# Patient Record
Sex: FEMALE | Employment: FULL TIME
[De-identification: names, ages, dates, MRNs, and addresses within clinical notes are randomized per-mention and may not be internally consistent; named-entity substitution may affect disease eponyms.]

---

## 2017-07-29 ENCOUNTER — HEALTH MAINTENANCE LETTER (OUTPATIENT)
Age: 57
End: 2017-07-29

## 2018-02-22 ASSESSMENT — ENCOUNTER SYMPTOMS
SYNCOPE: 0
DIFFICULTY URINATING: 0
PALPITATIONS: 0
HYPERTENSION: 0
DYSURIA: 0
WEIGHT LOSS: 0
HOT FLASHES: 1
ARTHRALGIAS: 1
CHILLS: 0
WEIGHT GAIN: 0
HEMATURIA: 0
DECREASED LIBIDO: 0
ALTERED TEMPERATURE REGULATION: 0
NIGHT SWEATS: 1
LEG PAIN: 1
DECREASED APPETITE: 0
LIGHT-HEADEDNESS: 0
HALLUCINATIONS: 0
POLYPHAGIA: 0
HYPOTENSION: 0
STIFFNESS: 1
FLANK PAIN: 0
POLYDIPSIA: 0
INCREASED ENERGY: 0
FEVER: 0
MYALGIAS: 0
FATIGUE: 1
SLEEP DISTURBANCES DUE TO BREATHING: 0
BACK PAIN: 1
ORTHOPNEA: 0
EXERCISE INTOLERANCE: 1
MUSCLE WEAKNESS: 0
NECK PAIN: 1
JOINT SWELLING: 1
MUSCLE CRAMPS: 0

## 2018-02-23 ENCOUNTER — APPOINTMENT (OUTPATIENT)
Dept: LAB | Facility: CLINIC | Age: 58
End: 2018-02-23
Payer: COMMERCIAL

## 2018-02-23 ENCOUNTER — OFFICE VISIT (OUTPATIENT)
Dept: CARDIOLOGY | Facility: CLINIC | Age: 58
End: 2018-02-23
Payer: COMMERCIAL

## 2018-02-23 VITALS
DIASTOLIC BLOOD PRESSURE: 81 MMHG | HEIGHT: 61 IN | BODY MASS INDEX: 45.88 KG/M2 | WEIGHT: 243 LBS | HEART RATE: 76 BPM | SYSTOLIC BLOOD PRESSURE: 141 MMHG

## 2018-02-23 DIAGNOSIS — Z13.6 SCREENING FOR CARDIOVASCULAR CONDITION: Primary | ICD-10-CM

## 2018-02-23 DIAGNOSIS — E78.5 HYPERLIPIDEMIA LDL GOAL <100: ICD-10-CM

## 2018-02-23 DIAGNOSIS — Z13.6 SCREENING FOR CARDIOVASCULAR CONDITION: ICD-10-CM

## 2018-02-23 LAB
CHOLEST SERPL-MCNC: 208 MG/DL
CREAT UR-MCNC: 92 MG/DL
CRP SERPL HS-MCNC: 12.3 MG/L
FEF 25/75: NORMAL
FEV-1: NORMAL
FEV1/FVC: NORMAL
FVC: NORMAL
GLUCOSE SERPL-MCNC: 103 MG/DL (ref 70–99)
HDLC SERPL-MCNC: 62 MG/DL
LDLC SERPL CALC-MCNC: 122 MG/DL
MICROALBUMIN UR-MCNC: <5 MG/L
MICROALBUMIN/CREAT UR: NORMAL MG/G CR (ref 0–25)
NONHDLC SERPL-MCNC: 146 MG/DL
NT-PROBNP SERPL-MCNC: 16 PG/ML (ref 0–125)
TRIGL SERPL-MCNC: 121 MG/DL

## 2018-02-23 RX ORDER — TRANEXAMIC ACID 650 MG/1
1300 TABLET ORAL PRN
COMMUNITY

## 2018-02-23 RX ORDER — IBUPROFEN 200 MG
600 TABLET ORAL AT BEDTIME
COMMUNITY

## 2018-02-23 RX ORDER — OXYBUTYNIN CHLORIDE 10 MG/1
1 TABLET, EXTENDED RELEASE ORAL DAILY
COMMUNITY

## 2018-02-23 RX ORDER — NICOTINE POLACRILEX 4 MG/1
1 GUM, CHEWING ORAL DAILY
COMMUNITY

## 2018-02-23 NOTE — LETTER
"2/23/2018      RE: Cristina Acosta  [address] [facility] [address]       Dear Colleague,    Thank you for the opportunity to participate in the care of your patient, Cristina Acosta, at the [facility] FOR CARDIOVASCULAR DISEASE PREVENTION at [facility]. Please see a copy of my visit note below.    [facility] for Cardiovascular Disease Prevention - Exam Note    Active Problems   There are no active problems to display for this patient.      Reason For Visit   Patient here for [facility] early detection of atherosclerosis and CVD exam.    Pain Evaluation  Current history of pain associated with this visit is: denied    HPI   Cristina Acosta is a 57 year old year old female with a history of high risk weight, arthritis, mild sleep apnea,  mildly elevated lipids and family history of CAD with her brother and sister with disease onset in their 60's. Diabetes is also prevalent in her family. She has not chest pain or palpitations or dizziness but she does get winded easily with walking fast and stair climbing.  She thinks this is related to her weight. She has sleep apnea and was unable to tolerate c-pap. We discussed getting an adjustable bed to elevate her head and possible dental appliance. Weight reduction would be most beneficial.    Nutrition assessment per patient report:   She did lose 40 pounds with slim [employer] but gained the weight back when she quit the program. She also tried Yvrose jono but didn't learn how to eat with this program. She does not like to \"have to count calories or points\" so has not done well with weight watchers. We discussed a \"cut in half\" eating plan with only eating half of higher calori foods with full portions of vegetables. She would like to reduce her weight.  Foods with fat/cholesterol (fried foods, fatty meats, junk food): 3- 4 servings per week  Variety, more fried food and red meat  Fruits and vegetables (  cup cooked, 1 cup " raw):  3 servings per day  Caffeine (1 cup coffee, soda, etc):  1 serving per day  Alcohol servings (12 oz. beer, 4 oz. wine, 1  oz. in mixed drink):  2 servings per week  Calcium servings (dairy foods, 8 oz. milk, yogurt, cheese, ice cream):  2 servings per day  Salt/sodium use:  light use  Special dietary habits:  low gluten    Activity  Patient is not regularly active. We discussed setting aside one 30 minute period per week to plan fitness time and gradually increasing over time. We discussed swimming since she has knee pain and walking for 10 minutes periods 3 times daily.  Laboratory Results Review  We discussed laboratory results today including lipids targets and how foods influence cholesterol.    Weight   A normal BMI of 25 is equal to 132 pounds.  The current BMI of 46 is high-risk range.  A weight reduction speed of 1-2 lbs per month for women is recommended.    PMH   Past Medical History:   Diagnosis Date     Bladder disorder     over active     GERD (gastroesophageal reflux disease)      Osteoarthritis     back and knees     Sleep apnea     Mild, did not tolerate c-pap     Weight disorder        PSH  Past Surgical History:   Procedure Laterality Date     C OPEN UTERUS,ABD FOR MOLE,ABORTN  2003     LAPAROSCOPIC CHOLECYSTECTOMY         Current Meds   Current Outpatient Prescriptions   Medication Sig Dispense Refill     Glucos-Chondroit-Hyaluron-MSM (GLUCOSAMINE CHONDROITIN JOINT) TABS Take 2 tablets by mouth daily       ibuprofen (ADVIL/MOTRIN) 200 MG tablet Take 600 mg by mouth At Bedtime       omeprazole 20 MG tablet Take 1 tablet by mouth daily       oxybutynin (DITROPAN-XL) 10 MG 24 hr tablet Take 1 tablet by mouth daily       tranexamic acid (LYSTEDA) 650 MG tablet Take 1,300 mg by mouth as needed         Allergies      Allergies   Allergen Reactions     Dust Mites      Seasonal Allergies        Family Hx   Family History   Problem Relation Age of Onset     Lung Cancer Mother       at age 69  "    Lipids Father      Prostate Cancer Father       at age 71     DIABETES Father      borerline     Coronary Artery Disease Sister 68     Asthma Sister      Thyroid Disease Sister      Graves disease     Coronary Artery Disease Brother 64     3x CABG     DIABETES Brother      Hyperlipidemia Brother      Hypertension Paternal Grandmother      DIABETES Paternal Grandmother      HEART DISEASE Paternal Grandmother      CEREBROVASCULAR DISEASE Paternal Grandmother 70     Other - See Comments Maternal Grandmother      MVA     Tuberculosis Maternal Grandfather      Liver Cancer Paternal Grandfather 90       Social History  Cristina is  and is working full time. Her job is [employer].  She is single with no children.     Enjoyment of life is 6 with 10 enjoys life fully.    Tobacco History  History   Smoking Status     Never Smoker   Smokeless Tobacco     Not on file       ROS  CONSTITUTIONAL:  No fever, chills, or sweats. No weight gain/loss.   EENT:  No visual disturbance, ear ache, epistaxis, sore throat  ALLERGIES/IMMUNOLOGIC:  Negative  RESPIRATORY:  No cough, hemoptysis  CARDIOVASCULAR:  As per HPI  GI:  No nausea, vomiting, hematemesis, melena  :  No urinary frequency, dysuria, or hematuria  INTEGUMENT:  Negative  PSYCHIATRIC:  Negative  NEURO:  Negative  ENDOCRINE:  Negative  MUSCULOSKELETAL:  Negative  MUSCULOSKELETAL:  knee and back pain     Vital Signs   /81 (BP Location: Left arm, Patient Position: Supine, Cuff Size: Adult Large)  Pulse 76  Ht 1.549 m (5' 1\")  Wt 110.2 kg (243 lb)  BMI 45.91 kg/m2          Physical Exam   In general, the patient is a pleasant female in no apparent distress.    HEENT: NC/AT.  PERRLA.  EOMI.  Sclerae white, not injected.  Nares clear.  Pharynx without erythema or exudate.  Dentition intact.    Neck: No adenopathy.  No thyromegaly.Carotids +4/4 bilaterally without bruits.  No jugular venous distension.   Lungs: CTA.  No ronchi, wheezes, rales.   "   Cor: RRR. Normal S1, S2 splits physiologically. No murmur, rub, click, or gallop. The PMI is in the 5th ICS in the midclavicular line. There is no heave.   Abdomen: Soft, nontender, nondistended. No organomegaly.  No bruits.   Extremities: No clubbing, cyanosis, or edema. The pulses are +2/2 at the post-tibial sites bilaterally. No bruits are noted.    Recent Labs  Lab Results   Component Value Date     (H) 02/23/2018      Lab Results   Component Value Date    NTBNP 16 02/23/2018     No results found for: NTBNPI   Lab Results   Component Value Date    UCRR 92 02/23/2018      Lab Results   Component Value Date    MICROL <5 02/23/2018      No results found for: MICROALBUMIN   No results found for: CRP   Lab Results   Component Value Date    CHOL 208 (H) 02/23/2018      Lab Results   Component Value Date    TRIG 121 02/23/2018      Lab Results   Component Value Date    HDL 62 02/23/2018      Lab Results   Component Value Date     (H) 02/23/2018      No results found for: VLDL   No results found for: CHOLHDLRATIO  Lab Results   Component Value Date    NHDL 146 (H) 02/23/2018          Johnson Test Results    BASIC SPIROMETRY: Summary of two attempts (see printout for details of results)  Results Estimated range for ht/age   FVC:2.56 liter FVC: 2.45-3.70 liter   FEV1: 2.39 liter FEV1: 1.88-2.94 liter     History of asthma:  NO   History of respiratory infection current/recent:  NO     Spirometry Results:  normal      WALKING BLOOD PRESSURE RESPONSE (3 minute, 5 MET level walk)   Pre BP: 110/70 mmHg  3 min BP: 166/60 mmHg  1 min post BP: 140/70 mmHg    Pre HR: 76 bpm  3 min HR: 145 bpm  1 min post HR: 70 bpm       RETINAL VASCULAR ASSESSMENT   Left Eye Abnormality:  none  AV Ratio: 0.96    Right Eye Abnormality:  none  AV Ratio: 0.86     Retinal Assessment:  normal      ABDOMINAL AORTA ULTRASOUND (< 2.5 normal, borderline 2.5-2.9, abnormal > 3)   SupraIliac 1.58 cm    SupraRenal 1.89 cm    InfraRenal  Proximal 1.60 cm    InfraRenal Distal 2.02 cm      Abdominal Aorta Assessment:  normal      LEFT VENTRICULAR ULTRASOUND MEASUREMENTS (adjusted for BSA)  LVIDD 38.7 mm   Septa 10.3 mm   Posterior 9.0 mm     Left Ventricular US Assessment:  normal      Carotid Artery IMT measurements report and plaques in the small area examined:   Left IMT 0.706 mm  Plaques none    Right IMT 0.599  Plaques none       ECG (see tracing):  normal sinus rhythm;  Rate:81  bpm      Arterial Elasticity per age and gender (see printout):   C1 9.1mL/mmHg x 10  abnormal   C2 2.7 mL/mmHg x 100 abnormal   Supine blood pressure: 143/81 mmHg       Assessment:     Cardiovascular:  ECG  Normal sinus rhythm rate 81. No chest pain or palpitations but she does have exertional shortness of breath. This may be weight related. Will discuss with team if follow up is recommended given her family history. Nt pro BNP is normal. No carotid IMT thickening or plaques. With family history of CAD in two sibs could consider cac scan.    Blood Pressure:  No history of HTN but supine blood pressures were elevated today but standing blood pressure low normal at 110/70. Last BP in 2016 with PCP was 130/88. Her small artery compliance is low.    Lipids:  LDL at 122 mildly elevated and HDL above average at 62.  Could consider statin to help small artery elasticity although this may further increase her glucose at 103 today. She does have family history of diabetes.    Health Habit Summary:  Nutrition: Heart Healthy Eating:  some of the time   Exercise:  not regularly active  Weight:  high-risk range  Tobacco Use:  never used    Full report to follow prevention team review of test results with scanned final report.    Time spent for patient visit was 60 minutes with more than half the time spent on counseling and coordination of care.    RANJANA Julian CNP       CC  Patient Care Team:  Vincent Lyn MD as PCP - General  SELF, REFERRED

## 2018-02-23 NOTE — MR AVS SNAPSHOT
"              After Visit Summary   2/23/2018    Cristina Acosta    MRN: [id number]           Patient Information     Date Of Birth          1960        Visit Information        Provider Department      2/23/2018 9:00 AM Daisy Dubois APRN CNP [facility] for Cardiovascular Disease Prevention        Today's Diagnoses     Screening for cardiovascular condition    -  1    Hyperlipidemia LDL goal <100           Follow-ups after your visit        Who to contact     Please call your clinic at [phone] to:    Ask questions about your health    Make or cancel appointments    Discuss your medicines    Learn about your test results    Speak to your doctor            Additional Information About Your Visit        MyChart Information     [employer] gives you secure access to your electronic health record. If you see a primary care provider, you can also send messages to your care team and make appointments. If you have questions, please call your primary care clinic.  If you do not have a primary care provider, please call [phone] and they will assist you.      [employer] is an electronic gateway that provides easy, online access to your medical records. With [employer], you can request a clinic appointment, read your test results, renew a prescription or communicate with your care team.     To access your existing account, please contact your [facility] or call [phone] for assistance.        Care EveryWhere ID     This is your Care EveryWhere ID. This could be used by other organizations to access your [place] medical records  [id number]        Your Vitals Were     Pulse Height BMI (Body Mass Index)             76 1.549 m (5' 1\") 45.91 kg/m2          Blood Pressure from Last 3 Encounters:   02/23/18 141/81   12/09/05 122/78    Weight from Last 3 Encounters:   02/23/18 110.2 kg (243 lb)   12/09/05 101.7 kg (224 lb 4.8 oz)              We Performed the Following     Arterial " PulseWave Analysis     EKG 12-lead, tracing only (Same Day)     Fundus Photography     Spirometry, Breathing Capacity (in clinic)          Today's Medication Changes          These changes are accurate as of 2/23/18 11:59 PM.  If you have any questions, ask your nurse or doctor.               Stop taking these medicines if you haven't already. Please contact your care team if you have questions.     ADVAIR DISKUS 100-50 MCG/DOSE diskus inhaler   Generic drug:  fluticasone-salmeterol   Stopped by:  Daisy Dubois, RANJANA MCDOWELL                    Primary Care Provider Office Phone # Fax #    Vincent HERNANDEZ MD Riki [phone]       [facility] [address]  [place] [address]        Equal Access to Services     [facility]: Hadii aad ku hadasho Sotreeali, waaxda luqadaha, qaybta kaalmada adeegyada, waxay olga ortega . So [facility] [phone].    ATENCIÓN: Si habla español, tiene a bustillo disposición servicios gratuitos de asistencia lingüística. [facility] [phone].    We comply with applicable federal civil rights laws and [place] laws. We do not discriminate on the basis of race, color, national origin, age, disability, sex, sexual orientation, or gender identity.            Thank you!     Thank you for choosing [facility] FOR CARDIOVASCULAR DISEASE PREVENTION  for your care. Our goal is always to provide you with excellent care. Hearing back from our patients is one way we can continue to improve our services. Please take a few minutes to complete the written survey that you may receive in the mail after your visit with us. Thank you!             Your Updated Medication List - Protect others around you: Learn how to safely use, store and throw away your medicines at www.disposemymeds.org.          This list is accurate as of 2/23/18 11:59 PM.  Always use your most recent med list.                   Brand Name Dispense Instructions for use Diagnosis     GLUCOSAMINE CHONDROITIN JOINT Tabs      Take 2 tablets by mouth daily    Screening for cardiovascular condition, Hyperlipidemia LDL goal <100       ibuprofen 200 MG tablet    ADVIL/MOTRIN     Take 600 mg by mouth At Bedtime    Screening for cardiovascular condition, Hyperlipidemia LDL goal <100       omeprazole 20 MG tablet      Take 1 tablet by mouth daily    Screening for cardiovascular condition, Hyperlipidemia LDL goal <100       oxybutynin 10 MG 24 hr tablet    DITROPAN-XL     Take 1 tablet by mouth daily    Screening for cardiovascular condition, Hyperlipidemia LDL goal <100       tranexamic acid 650 MG tablet    LYSTEDA     Take 1,300 mg by mouth as needed    Screening for cardiovascular condition, Hyperlipidemia LDL goal <100

## 2018-02-23 NOTE — PROGRESS NOTES
"[facility] - Exam Note    Active Problems   There are no active problems to display for this patient.      Reason For Visit   Patient here for [facility] early detection of atherosclerosis and CVD exam.    Pain Evaluation  Current history of pain associated with this visit is: denied    HPI   Cristina Acosta is a 57 year old year old female with a history of high risk weight, arthritis, mild sleep apnea,  mildly elevated lipids and family history of CAD with her brother and sister with disease onset in their 60's. Diabetes is also prevalent in her family. She has not chest pain or palpitations or dizziness but she does get winded easily with walking fast and stair climbing.  She thinks this is related to her weight. She has sleep apnea and was unable to tolerate c-pap. We discussed getting an adjustable bed to elevate her head and possible dental appliance. Weight reduction would be most beneficial.    Nutrition assessment per patient report:   She did lose 40 pounds with [employer] but gained the weight back when she quit the program. She also tried Yvrose jono but didn't learn how to eat with this program. She does not like to \"have to count calories or points\" so has not done well with weight watchers. We discussed a \"cut in half\" eating plan with only eating half of higher calori foods with full portions of vegetables. She would like to reduce her weight.  Foods with fat/cholesterol (fried foods, fatty meats, junk food): 3- 4 servings per week  Variety, more fried food and red meat  Fruits and vegetables (  cup cooked, 1 cup raw):  3 servings per day  Caffeine (1 cup coffee, soda, etc):  1 serving per day  Alcohol servings (12 oz. beer, 4 oz. wine, 1  oz. in mixed drink):  2 servings per week  Calcium servings (dairy foods, 8 oz. milk, yogurt, cheese, ice cream):  2 servings per day  Salt/sodium use:  light use  Special dietary habits:  low gluten    Activity  Patient " is not regularly active. We discussed setting aside one 30 minute period per week to plan fitness time and gradually increasing over time. We discussed swimming since she has knee pain and walking for 10 minutes periods 3 times daily.  Laboratory Results Review  We discussed laboratory results today including lipids targets and how foods influence cholesterol.    Weight   A normal BMI of 25 is equal to 132 pounds.  The current BMI of 46 is high-risk range.  A weight reduction speed of 1-2 lbs per month for women is recommended.    PMH   Past Medical History:   Diagnosis Date     Bladder disorder     over active     GERD (gastroesophageal reflux disease)      Osteoarthritis     back and knees     Sleep apnea     Mild, did not tolerate c-pap     Weight disorder        PSH  Past Surgical History:   Procedure Laterality Date     C OPEN UTERUS,ABD FOR MOLE,ABORTN  2003     LAPAROSCOPIC CHOLECYSTECTOMY         Current Meds   Current Outpatient Prescriptions   Medication Sig Dispense Refill     Glucos-Chondroit-Hyaluron-MSM (GLUCOSAMINE CHONDROITIN JOINT) TABS Take 2 tablets by mouth daily       ibuprofen (ADVIL/MOTRIN) 200 MG tablet Take 600 mg by mouth At Bedtime       omeprazole 20 MG tablet Take 1 tablet by mouth daily       oxybutynin (DITROPAN-XL) 10 MG 24 hr tablet Take 1 tablet by mouth daily       tranexamic acid (LYSTEDA) 650 MG tablet Take 1,300 mg by mouth as needed         Allergies      Allergies   Allergen Reactions     Dust Mites      Seasonal Allergies        Family Hx   Family History   Problem Relation Age of Onset     Lung Cancer Mother       at age 69     Lipids Father      Prostate Cancer Father       at age 71     DIABETES Father      borerline     Coronary Artery Disease Sister 68     Asthma Sister      Thyroid Disease Sister      Graves disease     Coronary Artery Disease Brother 64     3x CABG     DIABETES Brother      Hyperlipidemia Brother      Hypertension Paternal Grandmother       "DIABETES Paternal Grandmother      HEART DISEASE Paternal Grandmother      CEREBROVASCULAR DISEASE Paternal Grandmother 70     Other - See Comments Maternal Grandmother      MVA     Tuberculosis Maternal Grandfather      Liver Cancer Paternal Grandfather 90       Social History  Cristina is  and is working full time. Her job is [employer].  She is single with no children.     Enjoyment of life is 6 with 10 enjoys life fully.    Tobacco History  History   Smoking Status     Never Smoker   Smokeless Tobacco     Not on file       ROS  CONSTITUTIONAL:  No fever, chills, or sweats. No weight gain/loss.   EENT:  No visual disturbance, ear ache, epistaxis, sore throat  ALLERGIES/IMMUNOLOGIC:  Negative  RESPIRATORY:  No cough, hemoptysis  CARDIOVASCULAR:  As per HPI  GI:  No nausea, vomiting, hematemesis, melena  :  No urinary frequency, dysuria, or hematuria  INTEGUMENT:  Negative  PSYCHIATRIC:  Negative  NEURO:  Negative  ENDOCRINE:  Negative  MUSCULOSKELETAL:  Negative  MUSCULOSKELETAL:  knee and back pain     Vital Signs   /81 (BP Location: Left arm, Patient Position: Supine, Cuff Size: Adult Large)  Pulse 76  Ht 1.549 m (5' 1\")  Wt 110.2 kg (243 lb)  BMI 45.91 kg/m2          Physical Exam   In general, the patient is a pleasant female in no apparent distress.    HEENT: NC/AT.  PERRLA.  EOMI.  Sclerae white, not injected.  Nares clear.  Pharynx without erythema or exudate.  Dentition intact.    Neck: No adenopathy.  No thyromegaly.Carotids +4/4 bilaterally without bruits.  No jugular venous distension.   Lungs: CTA.  No ronchi, wheezes, rales.     Cor: RRR. Normal S1, S2 splits physiologically. No murmur, rub, click, or gallop. The PMI is in the 5th ICS in the midclavicular line. There is no heave.   Abdomen: Soft, nontender, nondistended. No organomegaly.  No bruits.   Extremities: No clubbing, cyanosis, or edema. The pulses are +2/2 at the post-tibial sites bilaterally. No bruits are " noted.    Recent Labs  Lab Results   Component Value Date     (H) 02/23/2018      Lab Results   Component Value Date    NTBNP 16 02/23/2018     No results found for: NTBNPI   Lab Results   Component Value Date    UCRR 92 02/23/2018      Lab Results   Component Value Date    MICROL <5 02/23/2018      No results found for: MICROALBUMIN   No results found for: CRP   Lab Results   Component Value Date    CHOL 208 (H) 02/23/2018      Lab Results   Component Value Date    TRIG 121 02/23/2018      Lab Results   Component Value Date    HDL 62 02/23/2018      Lab Results   Component Value Date     (H) 02/23/2018      No results found for: VLDL   No results found for: CHOLHDLRATIO  Lab Results   Component Value Date    NHDL 146 (H) 02/23/2018          Johnson Test Results    BASIC SPIROMETRY: Summary of two attempts (see printout for details of results)  Results Estimated range for ht/age   FVC:2.56 liter FVC: 2.45-3.70 liter   FEV1: 2.39 liter FEV1: 1.88-2.94 liter     History of asthma:  NO   History of respiratory infection current/recent:  NO     Spirometry Results:  normal      WALKING BLOOD PRESSURE RESPONSE (3 minute, 5 MET level walk)   Pre BP: 110/70 mmHg  3 min BP: 166/60 mmHg  1 min post BP: 140/70 mmHg    Pre HR: 76 bpm  3 min HR: 145 bpm  1 min post HR: 70 bpm       RETINAL VASCULAR ASSESSMENT   Left Eye Abnormality:  none  AV Ratio: 0.96    Right Eye Abnormality:  none  AV Ratio: 0.86     Retinal Assessment:  normal      ABDOMINAL AORTA ULTRASOUND (< 2.5 normal, borderline 2.5-2.9, abnormal > 3)   SupraIliac 1.58 cm    SupraRenal 1.89 cm    InfraRenal Proximal 1.60 cm    InfraRenal Distal 2.02 cm      Abdominal Aorta Assessment:  normal      LEFT VENTRICULAR ULTRASOUND MEASUREMENTS (adjusted for BSA)  LVIDD 38.7 mm   Septa 10.3 mm   Posterior 9.0 mm     Left Ventricular US Assessment:  normal      Carotid Artery IMT measurements report and plaques in the small area examined:   Left IMT 0.706 mm   Plaques none    Right IMT 0.599  Plaques none       ECG (see tracing):  normal sinus rhythm;  Rate:81  bpm      Arterial Elasticity per age and gender (see printout):   C1 9.1mL/mmHg x 10  abnormal   C2 2.7 mL/mmHg x 100 abnormal   Supine blood pressure: 143/81 mmHg       Assessment:     Cardiovascular:  ECG  Normal sinus rhythm rate 81. No chest pain or palpitations but she does have exertional shortness of breath. This may be weight related. Will discuss with team if follow up is recommended given her family history. Nt pro BNP is normal. No carotid IMT thickening or plaques. With family history of CAD in two sibs could consider cac scan.    Blood Pressure:  No history of HTN but supine blood pressures were elevated today but standing blood pressure low normal at 110/70. Last BP in 2016 with PCP was 130/88. Her small artery compliance is low.    Lipids:  LDL at 122 mildly elevated and HDL above average at 62.  Could consider statin to help small artery elasticity although this may further increase her glucose at 103 today. She does have family history of diabetes.    Health Habit Summary:  Nutrition: Heart Healthy Eating:  some of the time   Exercise:  not regularly active  Weight:  high-risk range  Tobacco Use:  never used    Full report to follow prevention team review of test results with scanned final report.    Time spent for patient visit was 60 minutes with more than half the time spent on counseling and coordination of care.    RANJANA Julian CNP       CC  Patient Care Team:  Vincent Lyn MD as PCP - General  SELF, REFERRED  Answers for HPI/ROS submitted by the patient on 2/22/2018   General Symptoms: Yes  Skin Symptoms: No  HENT Symptoms: No  EYE SYMPTOMS: No  HEART SYMPTOMS: Yes  LUNG SYMPTOMS: No  INTESTINAL SYMPTOMS: No  URINARY SYMPTOMS: Yes  GYNECOLOGIC SYMPTOMS: Yes  BREAST SYMPTOMS: No  SKELETAL SYMPTOMS: Yes  BLOOD SYMPTOMS: No  NERVOUS SYSTEM SYMPTOMS: No  MENTAL HEALTH SYMPTOMS:  No  Fever: No  Loss of appetite: No  Weight loss: No  Weight gain: No  Fatigue: Yes  Night sweats: Yes  Chills: No  Increased stress: No  Excessive hunger: No  Excessive thirst: No  Feeling hot or cold when others believe the temperature is normal: No  Loss of height: No  Post-operative complications: No  Surgical site pain: No  Hallucinations: No  Change in or Loss of Energy: No  Hyperactivity: No  Confusion: No  Chest pain or pressure: No  Fast or irregular heartbeat: No  Pain in legs with walking: Yes  Trouble breathing while lying down: No  Fingers or toes appear blue: No  High blood pressure: No  Low blood pressure: No  Fainting: No  Murmurs: No  Pacemaker: No  Varicose veins: No  Edema or swelling: Yes  Wake up at night with shortness of breath: No  Light-headedness: No  Exercise intolerance: Yes  Trouble holding urine or incontinence: Yes  Pain or burning: No  Trouble starting or stopping: No  Increased frequency of urination: Yes  Blood in urine: No  Decreased frequency of urination: No  Frequent nighttime urination: Yes  Flank pain: No  Difficulty emptying bladder: No  Back pain: Yes  Muscle aches: No  Neck pain: Yes  Swollen joints: Yes  Joint pain: Yes  Bone pain: No  Muscle cramps: No  Muscle weakness: No  Joint stiffness: Yes  Bone fracture: No  Bleeding or spotting between periods: No  Heavy or painful periods: No  Irregular periods: Yes  Vaginal discharge: No  Hot flashes: Yes  Vaginal dryness: No  Genital ulcers: No  Reduced libido: No  Painful intercourse: No  Difficulty with sexual arousal: No  Post-menopausal bleeding: No

## 2018-02-26 LAB — INTERPRETATION ECG - MUSE: NORMAL

## 2018-03-02 DIAGNOSIS — Z82.49 FAMILY HISTORY OF CORONARY ARTERIOSCLEROSIS: ICD-10-CM

## 2018-03-02 DIAGNOSIS — E78.5 HYPERLIPIDEMIA LDL GOAL <100: Primary | ICD-10-CM

## 2018-03-05 ENCOUNTER — HOSPITAL ENCOUNTER (OUTPATIENT)
Dept: CT IMAGING | Facility: CLINIC | Age: 58
Discharge: HOME | End: 2018-03-05
Attending: NURSE PRACTITIONER | Admitting: NURSE PRACTITIONER
Payer: COMMERCIAL

## 2018-03-05 DIAGNOSIS — E78.5 HYPERLIPIDEMIA LDL GOAL <100: ICD-10-CM

## 2018-03-05 DIAGNOSIS — Z82.49 FAMILY HISTORY OF CORONARY ARTERIOSCLEROSIS: ICD-10-CM

## 2018-03-05 LAB — RADIOLOGIST FLAGS: NORMAL

## 2018-03-05 PROCEDURE — 75571 CT HRT W/O DYE W/CA TEST: CPT | Mod: 26 | Performed by: INTERNAL MEDICINE

## 2018-03-05 PROCEDURE — 75571 CT HRT W/O DYE W/CA TEST: CPT

## 2018-03-06 ENCOUNTER — TELEPHONE (OUTPATIENT)
Dept: CARDIOLOGY | Facility: CLINIC | Age: 58
End: 2018-03-06

## 2018-03-06 NOTE — TELEPHONE ENCOUNTER
Reviewed CAC scan results with score of 0, low CV risk category. Incidental lung finding suggest possible infection or other cause. She does report a recent cough. Will forward results to her PCP Dr. Michael and she will call him next week to discuss these results, follow up and her cough. She is aware a recheck in 3 months is recommended.

## 2019-11-04 ENCOUNTER — HEALTH MAINTENANCE LETTER (OUTPATIENT)
Age: 59
End: 2019-11-04

## 2020-01-30 ASSESSMENT — MIFFLIN-ST. JEOR: SCORE: 1576.52

## 2020-02-06 ENCOUNTER — ANESTHESIA - HEALTHEAST (OUTPATIENT)
Dept: SURGERY | Facility: CLINIC | Age: 60
End: 2020-02-06

## 2020-02-06 ENCOUNTER — DOCUMENTATION ONLY (OUTPATIENT)
Dept: OTHER | Facility: CLINIC | Age: 60
End: 2020-02-06

## 2020-02-06 ENCOUNTER — AMBULATORY - HEALTHEAST (OUTPATIENT)
Dept: OTHER | Facility: CLINIC | Age: 60
End: 2020-02-06

## 2020-02-06 ENCOUNTER — SURGERY - HEALTHEAST (OUTPATIENT)
Dept: SURGERY | Facility: CLINIC | Age: 60
End: 2020-02-06

## 2020-02-06 ASSESSMENT — MIFFLIN-ST. JEOR: SCORE: 1562.92

## 2020-02-07 ENCOUNTER — AMBULATORY - HEALTHEAST (OUTPATIENT)
Dept: OTHER | Facility: CLINIC | Age: 60
End: 2020-02-07

## 2020-02-07 ASSESSMENT — MIFFLIN-ST. JEOR: SCORE: 1595.57

## 2020-10-02 ENCOUNTER — AMBULATORY - HEALTHEAST (OUTPATIENT)
Dept: SURGERY | Facility: CLINIC | Age: 60
End: 2020-10-02

## 2020-10-02 DIAGNOSIS — Z11.59 ENCOUNTER FOR SCREENING FOR OTHER VIRAL DISEASES: ICD-10-CM

## 2020-11-03 ASSESSMENT — MIFFLIN-ST. JEOR: SCORE: 1641.26

## 2020-11-09 ENCOUNTER — AMBULATORY - HEALTHEAST (OUTPATIENT)
Dept: LAB | Facility: CLINIC | Age: 60
End: 2020-11-09

## 2020-11-09 DIAGNOSIS — Z11.59 ENCOUNTER FOR SCREENING FOR OTHER VIRAL DISEASES: ICD-10-CM

## 2020-11-11 ENCOUNTER — COMMUNICATION - HEALTHEAST (OUTPATIENT)
Dept: SCHEDULING | Facility: CLINIC | Age: 60
End: 2020-11-11

## 2020-11-12 ENCOUNTER — ANESTHESIA - HEALTHEAST (OUTPATIENT)
Dept: SURGERY | Facility: CLINIC | Age: 60
End: 2020-11-12

## 2020-11-12 ENCOUNTER — SURGERY - HEALTHEAST (OUTPATIENT)
Dept: SURGERY | Facility: CLINIC | Age: 60
End: 2020-11-12

## 2020-11-12 ASSESSMENT — MIFFLIN-ST. JEOR: SCORE: 1643.65

## 2020-11-22 ENCOUNTER — HEALTH MAINTENANCE LETTER (OUTPATIENT)
Age: 60
End: 2020-11-22

## 2020-11-30 ENCOUNTER — RECORDS - HEALTHEAST (OUTPATIENT)
Dept: ADMINISTRATIVE | Facility: OTHER | Age: 60
End: 2020-11-30

## 2020-11-30 ENCOUNTER — HOSPITAL ENCOUNTER (OUTPATIENT)
Dept: ULTRASOUND IMAGING | Facility: CLINIC | Age: 60
Discharge: HOME | End: 2020-11-30
Attending: ORTHOPAEDIC SURGERY

## 2020-11-30 DIAGNOSIS — M25.461 PAIN AND SWELLING OF RIGHT KNEE: ICD-10-CM

## 2020-11-30 DIAGNOSIS — M25.561 PAIN AND SWELLING OF RIGHT KNEE: ICD-10-CM

## 2021-06-04 VITALS — WEIGHT: 234.2 LBS | HEIGHT: 61 IN | BODY MASS INDEX: 44.22 KG/M2

## 2021-06-05 VITALS — HEIGHT: 61 IN | WEIGHT: 249.4 LBS | BODY MASS INDEX: 47.09 KG/M2

## 2021-06-05 NOTE — ANESTHESIA POSTPROCEDURE EVALUATION
Patient: Cristina Acosta  Procedure(s):  LEFT TOTAL KNEE ARTHROPLASTY (Left)  Anesthesia type: spinal    Patient location: PACU  Last vitals:   Vitals Value Taken Time   /57 2/6/2020  2:20 PM   Temp 36.6  C (97.8  F) 2/6/2020  2:00 PM   Pulse 73 2/6/2020  2:21 PM   Resp 12 2/6/2020  2:20 PM   SpO2 100 % 2/6/2020  2:21 PM   Vitals shown include unvalidated device data.  Post vital signs: stable  Level of consciousness: awake and responds to simple questions  Post-anesthesia pain: pain controlled  Post-anesthesia nausea and vomiting: no  Pulmonary: unassisted, return to baseline  Cardiovascular: stable and blood pressure at baseline  Hydration: adequate  Anesthetic events: no    QCDR Measures:  ASA# 11 - Miriam-op Cardiac Arrest: ASA11B - Patient did NOT experience unanticipated cardiac arrest  ASA# 12 - Miriam-op Mortality Rate: ASA12B - Patient did NOT die  ASA# 13 - PACU Re-Intubation Rate: ASA13B - Patient did NOT require a new airway mgmt  ASA# 10 - Composite Anes Safety: ASA10A - No serious adverse event    Additional Notes:

## 2021-06-05 NOTE — ANESTHESIA PROCEDURE NOTES
Spinal Block    Patient location during procedure: OR  Start time: 2/6/2020 11:18 AM  End time: 2/6/2020 11:22 AM  Reason for block: primary anesthetic    Staffing:  Performing  Anesthesiologist: Rajiv Martinez MD    Preanesthetic Checklist  Completed: patient identified, risks, benefits, and alternatives discussed, timeout performed, consent obtained, airway assessed, oxygen available, suction available, emergency drugs available and hand hygiene performed  Spinal Block  Patient position: sitting  Prep: ChloraPrep and site prepped and draped  Patient monitoring: heart rate, cardiac monitor, continuous pulse ox and blood pressure  Approach: midline  Location: L3-4  Injection technique: single-shot  Needle type: pencil-tip   Needle gauge: 24 G

## 2021-06-05 NOTE — ANESTHESIA PREPROCEDURE EVALUATION
Anesthesia Evaluation      Patient summary reviewed     Airway   Mallampati: II  Neck ROM: full   Pulmonary - negative ROS and normal exam    breath sounds clear to auscultation  (+) sleep apnea on CPAP, ,                          Cardiovascular - negative ROS and normal exam  Rhythm: regular  Rate: normal,         Neuro/Psych - negative ROS     Endo/Other    (+) arthritis, obesity,      GI/Hepatic/Renal    (+) GERD well controlled,             Dental - normal exam                        Anesthesia Plan  Planned anesthetic: spinal and peripheral nerve block    ASA 3     Anesthetic plan and risks discussed with: patient and spouse    Post-op plan: routine recovery

## 2021-06-05 NOTE — PROGRESS NOTES
S: Pt. seen in the [facility]. Female. Bre CNP. RX: Left AFO, Shoe. DX: Left foot drop.  O:A: Pt. had knee surgery yesterday and has developed left foot drop. Today I F/D a Left Regulo saldana Walk on AFO size Small. AFO to inhibit foot drop when ambulating. Shoe is needed due edema in lower extremity because Pt. could not don her own shoe. Pt. ambulated in her room with a heel toe gait pattern. Donning doffing wear and care instructions given.  P: Pt. to be seen as needed.    Crow TERRAZAS,KATELYN

## 2021-06-05 NOTE — ANESTHESIA CARE TRANSFER NOTE
Last vitals:   Vitals:    02/06/20 1324   BP: 106/64   Pulse: 80   Resp: 14   Temp: 37  C (98.6  F)   SpO2: 100%     Patient's level of consciousness is drowsy  Spontaneous respirations: yes  Maintains airway independently: yes  Dentition unchanged: yes  Oropharynx: oropharynx clear of all foreign objects    QCDR Measures:  ASA# 20 - Surgical Safety Checklist: WHO surgical safety checklist completed prior to induction    PQRS# 430 - Adult PONV Prevention: 4558F - Pt received => 2 anti-emetic agents (different classes) preop & intraop  ASA# 8 - Peds PONV Prevention: NA - Not pediatric patient, not GA or 2 or more risk factors NOT present  PQRS# 424 - Miriam-op Temp Management: 4559F - At least one body temp DOCUMENTED => 35.5C or 95.9F within required timeframe  PQRS# 426 - PACU Transfer Protocol: - Transfer of care checklist used  ASA# 14 - Acute Post-op Pain: ASA14B - Patient did NOT experience pain >= 7 out of 10

## 2021-06-05 NOTE — ANESTHESIA PROCEDURE NOTES
Peripheral Block    Patient location during procedure: pre-op  Start time: 2/6/2020 10:03 AM  End time: 2/6/2020 10:05 AM  post-op analgesia per surgeon order as noted in medical record  Staffing:  Performing  Anesthesiologist: Rajiv Martinez MD  Preanesthetic Checklist  Completed: patient identified, site marked, risks, benefits, and alternatives discussed, timeout performed, consent obtained, airway assessed, oxygen available, suction available, emergency drugs available and hand hygiene performed  Peripheral Block  Block type: saphenous, adductor canal block  Prep: ChloraPrep  Patient position: supine  Patient monitoring: cardiac monitor, continuous pulse oximetry, heart rate and blood pressure  Laterality: left  Injection technique: ultrasound guided    Ultrasound used to visualize needle placement in proximity to nerve being blocked: yes   US used to visualize anesthetic spread  Visualized anatomic structures normal  No Pathological Findings  Permanent ultrasound image captured for medical record  Sterile gel and probe cover used for ultrasound.  Needle  Needle type: Stimuplex   Needle gauge: 20G  Needle length: 6 in  no peripheral nerve catheter placed  Assessment  Injection assessment: no difficulty with injection, negative aspiration for heme, no paresthesia on injection and incremental injection

## 2021-06-13 NOTE — ANESTHESIA POSTPROCEDURE EVALUATION
Patient: Cristina Acosta  Procedure(s):  RIGHT TOTAL KNEE ARTHROPLASTY (Right)  Anesthesia type: spinal    Patient location: PACU  Last vitals:   Vitals Value Taken Time   /83 11/12/20 1310   Temp 36.6  C (97.8  F) 11/12/20 1250   Pulse 77 11/12/20 1311   Resp 19 11/12/20 1310   SpO2 97 % 11/12/20 1311   Vitals shown include unvalidated device data.  Post vital signs: stable  Level of consciousness: awake and responds to simple questions  Post-anesthesia pain: pain controlled  Post-anesthesia nausea and vomiting: no  Pulmonary: unassisted, return to baseline  Cardiovascular: stable and blood pressure at baseline  Hydration: adequate  Anesthetic events: no    QCDR Measures:  ASA# 11 - Miriam-op Cardiac Arrest: ASA11B - Patient did NOT experience unanticipated cardiac arrest  ASA# 12 - Miriam-op Mortality Rate: ASA12B - Patient did NOT die  ASA# 13 - PACU Re-Intubation Rate: NA - No ETT / LMA used for case  ASA# 10 - Composite Anes Safety: ASA10A - No serious adverse event    Additional Notes:  Recovery as anticipated from spinal anesthetic.  No apparent complications.

## 2021-06-13 NOTE — ANESTHESIA CARE TRANSFER NOTE
Last vitals:   Vitals:    11/12/20 1230   BP: 132/70   Pulse: 76   Resp: 16   Temp: 36.8  C (98.3  F)   SpO2: 100%     Patient's level of consciousness is awake  Spontaneous respirations: yes  Maintains airway independently: yes  Dentition unchanged: yes  Oropharynx: oropharynx clear of all foreign objects    QCDR Measures:  ASA# 20 - Surgical Safety Checklist: WHO surgical safety checklist completed prior to induction    PQRS# 430 - Adult PONV Prevention: 4558F - Pt received => 2 anti-emetic agents (different classes) preop & intraop  ASA# 8 - Peds PONV Prevention: NA - Not pediatric patient, not GA or 2 or more risk factors NOT present  PQRS# 424 - Miriam-op Temp Management: 4559F - At least one body temp DOCUMENTED => 35.5C or 95.9F within required timeframe  PQRS# 426 - PACU Transfer Protocol: - Transfer of care checklist used  ASA# 14 - Acute Post-op Pain: ASA14B - Patient did NOT experience pain >= 7 out of 10

## 2021-06-13 NOTE — ANESTHESIA PREPROCEDURE EVALUATION
Anesthesia Evaluation      Patient summary reviewed     Airway   Mallampati: II  Neck ROM: full   Pulmonary - normal exam   (+) sleep apnea on CPAP, ,                          Cardiovascular - negative ROS and normal exam   Neuro/Psych - negative ROS     Endo/Other - negative ROS      GI/Hepatic/Renal    (+) GERD,             Dental - normal exam                        Anesthesia Plan  Planned anesthetic: spinal and peripheral nerve block    ASA 3   Induction: intravenous   Anesthetic plan and risks discussed with: patient    Post-op plan: routine recovery

## 2021-06-13 NOTE — ANESTHESIA PROCEDURE NOTES
Spinal Block    Patient location during procedure: OR  Start time: 11/12/2020 9:38 AM  End time: 11/12/2020 9:59 AM  Reason for block: primary anesthetic    Staffing:  Performing  Anesthesiologist: Jay Pate MD    Preanesthetic Checklist  Completed: patient identified, risks, benefits, and alternatives discussed, timeout performed, consent obtained, airway assessed, oxygen available, suction available, emergency drugs available and hand hygiene performed  Spinal Block  Patient position: sitting  Prep: ChloraPrep  Patient monitoring: heart rate, cardiac monitor, continuous pulse ox and blood pressure  Approach: midline  Location: L2-3  Injection technique: single-shot  Needle type: pencil-tip   Needle gauge: 24 G      Additional Notes:  Very difficult spinal, multiple attempts.

## 2021-06-16 PROBLEM — M17.0 PRIMARY OSTEOARTHRITIS OF KNEES, BILATERAL: Status: ACTIVE | Noted: 2020-02-06

## 2021-09-18 ENCOUNTER — HEALTH MAINTENANCE LETTER (OUTPATIENT)
Age: 61
End: 2021-09-18

## 2021-11-13 ENCOUNTER — HEALTH MAINTENANCE LETTER (OUTPATIENT)
Age: 61
End: 2021-11-13

## 2022-01-08 ENCOUNTER — HEALTH MAINTENANCE LETTER (OUTPATIENT)
Age: 62
End: 2022-01-08

## 2022-11-20 ENCOUNTER — HEALTH MAINTENANCE LETTER (OUTPATIENT)
Age: 62
End: 2022-11-20

## 2023-04-15 ENCOUNTER — HEALTH MAINTENANCE LETTER (OUTPATIENT)
Age: 63
End: 2023-04-15

## 2023-11-25 ENCOUNTER — HEALTH MAINTENANCE LETTER (OUTPATIENT)
Age: 63
End: 2023-11-25

## 2024-01-10 ENCOUNTER — LAB REQUISITION (OUTPATIENT)
Dept: LAB | Facility: CLINIC | Age: 64
End: 2024-01-10

## 2024-01-10 DIAGNOSIS — Z12.4 ENCOUNTER FOR SCREENING FOR MALIGNANT NEOPLASM OF CERVIX: ICD-10-CM

## 2024-01-10 PROCEDURE — 87624 HPV HI-RISK TYP POOLED RSLT: CPT | Performed by: OBSTETRICS & GYNECOLOGY

## 2024-01-10 PROCEDURE — G0145 SCR C/V CYTO,THINLAYER,RESCR: HCPCS | Performed by: OBSTETRICS & GYNECOLOGY

## 2024-01-12 LAB
BKR LAB AP GYN ADEQUACY: NORMAL
BKR LAB AP GYN INTERPRETATION: NORMAL
BKR LAB AP HPV REFLEX: NORMAL
BKR LAB AP LMP: NORMAL
BKR LAB AP PREVIOUS ABNL DX: NORMAL
BKR LAB AP PREVIOUS ABNORMAL: NORMAL
PATH REPORT.COMMENTS IMP SPEC: NORMAL
PATH REPORT.COMMENTS IMP SPEC: NORMAL
PATH REPORT.RELEVANT HX SPEC: NORMAL

## 2024-01-16 LAB
HUMAN PAPILLOMA VIRUS 16 DNA: NEGATIVE
HUMAN PAPILLOMA VIRUS 18 DNA: NEGATIVE
HUMAN PAPILLOMA VIRUS FINAL DIAGNOSIS: NORMAL
HUMAN PAPILLOMA VIRUS OTHER HR: NEGATIVE

## 2024-05-28 ENCOUNTER — TRANSCRIBE ORDERS (OUTPATIENT)
Dept: OTHER | Age: 64
End: 2024-05-28

## 2024-05-28 DIAGNOSIS — E66.01 MORBID OBESITY WITH BODY MASS INDEX (BMI) OF 45.0 TO 49.9 IN ADULT (H): Primary | ICD-10-CM

## 2024-06-22 ENCOUNTER — HEALTH MAINTENANCE LETTER (OUTPATIENT)
Age: 64
End: 2024-06-22